# Patient Record
(demographics unavailable — no encounter records)

---

## 2024-12-04 NOTE — HISTORY OF PRESENT ILLNESS
[de-identified] : 12/4/24 34 y/o Italian-speaking male presents for initial consultation for new headaches x 1.5 years and long-standing deviated nasal septum. Nasal congestion, worse on the left. He presented initially to his PCP with complaint of headaches and memory loss. MRI Sinuses wo contrast 8/26/24 (pt provided outside report) demonstrating left septal deviation "partially encroaching on left nasal cavity" and was told that this was cutting off oxygen to his brain and could be contributing to his headaches and memory loss and was referred to ENT. He also saw a neurologist who gave him a 6 week trial of Divalproex for migraines yesterday but he has not yet started it. MRI sinus report notes findings suggestive of fibrous dysplasia, but not CT sinus available to review and no images available.   MRI showed  nasal septal deviation Accompanied by friend.

## 2024-12-04 NOTE — DATA REVIEWED
[de-identified] : Outside MRI Sinus wo contrast Report (provided by pt, ordered by Dr. Annamarie CORONA). left septal deviation. right greater wing of sphenoid diffusely thickened with heterogenous low to intermediate T2/T1 signal, suggesting underlying sclerotic changes, most compatitble with monostotic fibrous dysplasia.

## 2024-12-04 NOTE — ASSESSMENT
FreeTextEntry1] : MRI Sinus report provided by pt reviewed. Noting obstructive left septal deviation. Likely not underlying new headaches, advised him to start the Divalproex from his neurologist and to continue to follow up with them. 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   Follow up after imaging to review and discuss next steps.

## 2024-12-04 NOTE — REASON FOR VISIT
[Initial Consultation] : an initial consultation for [[employer] ] : provided by [employer]   [FreeTextEntry2] : deviated nasal septum